# Patient Record
Sex: FEMALE | Race: WHITE | NOT HISPANIC OR LATINO | URBAN - METROPOLITAN AREA
[De-identification: names, ages, dates, MRNs, and addresses within clinical notes are randomized per-mention and may not be internally consistent; named-entity substitution may affect disease eponyms.]

---

## 2018-11-17 ENCOUNTER — INPATIENT HOSPITAL (OUTPATIENT)
Dept: URBAN - METROPOLITAN AREA HOSPITAL 107 | Facility: HOSPITAL | Age: 68
End: 2018-11-17
Payer: COMMERCIAL

## 2018-11-17 DIAGNOSIS — R18.8 OTHER ASCITES: ICD-10-CM

## 2018-11-17 DIAGNOSIS — K74.60 UNSPECIFIED CIRRHOSIS OF LIVER: ICD-10-CM

## 2018-11-17 DIAGNOSIS — R93.2 ABNORMAL FINDINGS ON DIAGNOSTIC IMAGING OF LIVER AND BILIARY: ICD-10-CM

## 2018-11-17 DIAGNOSIS — R07.9 CHEST PAIN, UNSPECIFIED: ICD-10-CM

## 2018-11-17 DIAGNOSIS — R60.1 GENERALIZED EDEMA: ICD-10-CM

## 2018-11-17 PROCEDURE — 99223 1ST HOSP IP/OBS HIGH 75: CPT

## 2018-11-19 ENCOUNTER — INPATIENT HOSPITAL (OUTPATIENT)
Dept: URBAN - METROPOLITAN AREA HOSPITAL 107 | Facility: HOSPITAL | Age: 68
End: 2018-11-19
Payer: COMMERCIAL

## 2018-11-19 DIAGNOSIS — R10.11 RIGHT UPPER QUADRANT PAIN: ICD-10-CM

## 2018-11-19 DIAGNOSIS — K74.69 OTHER CIRRHOSIS OF LIVER: ICD-10-CM

## 2018-11-19 DIAGNOSIS — R18.8 OTHER ASCITES: ICD-10-CM

## 2018-11-19 PROCEDURE — 99231 SBSQ HOSP IP/OBS SF/LOW 25: CPT | Performed by: PHYSICIAN ASSISTANT

## 2023-12-17 ENCOUNTER — APPOINTMENT (OUTPATIENT)
Dept: CT IMAGING | Facility: HOSPITAL | Age: 73
End: 2023-12-17
Payer: MEDICARE

## 2023-12-17 ENCOUNTER — HOSPITAL ENCOUNTER (EMERGENCY)
Facility: HOSPITAL | Age: 73
Discharge: HOME | End: 2023-12-17
Attending: EMERGENCY MEDICINE
Payer: MEDICARE

## 2023-12-17 VITALS
WEIGHT: 256 LBS | DIASTOLIC BLOOD PRESSURE: 74 MMHG | TEMPERATURE: 98.8 F | OXYGEN SATURATION: 97 % | SYSTOLIC BLOOD PRESSURE: 165 MMHG | RESPIRATION RATE: 18 BRPM | BODY MASS INDEX: 43.71 KG/M2 | HEART RATE: 75 BPM | HEIGHT: 64 IN

## 2023-12-17 DIAGNOSIS — R31.0 GROSS HEMATURIA: Primary | ICD-10-CM

## 2023-12-17 LAB
ABO GROUP BLD: NORMAL
ALBUMIN SERPL-MCNC: 3.2 G/DL (ref 3.5–5.2)
ALBUMIN/GLOB SERPL: 1.1 G/DL
ALP SERPL-CCNC: 119 U/L (ref 39–117)
ALT SERPL W P-5'-P-CCNC: 13 U/L (ref 1–33)
ANION GAP SERPL CALCULATED.3IONS-SCNC: 12 MMOL/L (ref 5–15)
AST SERPL-CCNC: 12 U/L (ref 1–32)
BASOPHILS # BLD AUTO: 0.09 10*3/MM3 (ref 0–0.2)
BASOPHILS NFR BLD AUTO: 0.7 % (ref 0–1.5)
BILIRUB SERPL-MCNC: 0.2 MG/DL (ref 0–1.2)
BLD GP AB SCN SERPL QL: NEGATIVE
BUN SERPL-MCNC: 13 MG/DL (ref 8–23)
BUN/CREAT SERPL: 11.2 (ref 7–25)
CALCIUM SPEC-SCNC: 8.8 MG/DL (ref 8.6–10.5)
CHLORIDE SERPL-SCNC: 106 MMOL/L (ref 98–107)
CO2 SERPL-SCNC: 20 MMOL/L (ref 22–29)
CREAT SERPL-MCNC: 1.16 MG/DL (ref 0.57–1)
DEPRECATED RDW RBC AUTO: 46.7 FL (ref 37–54)
EGFRCR SERPLBLD CKD-EPI 2021: 49.9 ML/MIN/1.73
EOSINOPHIL # BLD AUTO: 0.94 10*3/MM3 (ref 0–0.4)
EOSINOPHIL NFR BLD AUTO: 7.2 % (ref 0.3–6.2)
ERYTHROCYTE [DISTWIDTH] IN BLOOD BY AUTOMATED COUNT: 13.9 % (ref 12.3–15.4)
GLOBULIN UR ELPH-MCNC: 3 GM/DL
GLUCOSE SERPL-MCNC: 115 MG/DL (ref 65–99)
HCT VFR BLD AUTO: 29 % (ref 34–46.6)
HGB BLD-MCNC: 9.2 G/DL (ref 12–15.9)
INR PPP: 1.16 (ref 0.9–1.1)
LYMPHOCYTES # BLD AUTO: 1.54 10*3/MM3 (ref 0.7–3.1)
LYMPHOCYTES NFR BLD AUTO: 11.8 % (ref 19.6–45.3)
MCH RBC QN AUTO: 29 PG (ref 26.6–33)
MCHC RBC AUTO-ENTMCNC: 31.7 G/DL (ref 31.5–35.7)
MCV RBC AUTO: 91.5 FL (ref 79–97)
MONOCYTES # BLD AUTO: 0.98 10*3/MM3 (ref 0.1–0.9)
MONOCYTES NFR BLD AUTO: 7.5 % (ref 5–12)
NEUTROPHILS NFR BLD AUTO: 62.3 % (ref 42.7–76)
NEUTROPHILS NFR BLD AUTO: 8.1 10*3/MM3 (ref 1.7–7)
PLATELET # BLD AUTO: 558 10*3/MM3 (ref 140–450)
PMV BLD AUTO: 8.9 FL (ref 6–12)
POTASSIUM SERPL-SCNC: 3.7 MMOL/L (ref 3.5–5.2)
PROT SERPL-MCNC: 6.2 G/DL (ref 6–8.5)
PROTHROMBIN TIME: 15 SECONDS (ref 11.7–14.2)
RBC # BLD AUTO: 3.17 10*6/MM3 (ref 3.77–5.28)
RH BLD: NEGATIVE
SODIUM SERPL-SCNC: 138 MMOL/L (ref 136–145)
T&S EXPIRATION DATE: NORMAL
WBC NRBC COR # BLD AUTO: 13.01 10*3/MM3 (ref 3.4–10.8)

## 2023-12-17 PROCEDURE — 86900 BLOOD TYPING SEROLOGIC ABO: CPT | Performed by: PHYSICIAN ASSISTANT

## 2023-12-17 PROCEDURE — 25510000001 IOPAMIDOL 61 % SOLUTION: Performed by: PHYSICIAN ASSISTANT

## 2023-12-17 PROCEDURE — 96374 THER/PROPH/DIAG INJ IV PUSH: CPT

## 2023-12-17 PROCEDURE — 25010000002 MORPHINE PER 10 MG: Performed by: EMERGENCY MEDICINE

## 2023-12-17 PROCEDURE — 86850 RBC ANTIBODY SCREEN: CPT | Performed by: PHYSICIAN ASSISTANT

## 2023-12-17 PROCEDURE — 86901 BLOOD TYPING SEROLOGIC RH(D): CPT | Performed by: PHYSICIAN ASSISTANT

## 2023-12-17 PROCEDURE — 99285 EMERGENCY DEPT VISIT HI MDM: CPT

## 2023-12-17 PROCEDURE — 80053 COMPREHEN METABOLIC PANEL: CPT | Performed by: PHYSICIAN ASSISTANT

## 2023-12-17 PROCEDURE — 96375 TX/PRO/DX INJ NEW DRUG ADDON: CPT

## 2023-12-17 PROCEDURE — 85610 PROTHROMBIN TIME: CPT | Performed by: PHYSICIAN ASSISTANT

## 2023-12-17 PROCEDURE — 25010000002 ONDANSETRON PER 1 MG: Performed by: EMERGENCY MEDICINE

## 2023-12-17 PROCEDURE — 74177 CT ABD & PELVIS W/CONTRAST: CPT

## 2023-12-17 PROCEDURE — 85025 COMPLETE CBC W/AUTO DIFF WBC: CPT | Performed by: PHYSICIAN ASSISTANT

## 2023-12-17 RX ORDER — SACCHAROMYCES BOULARDII 250 MG
250 CAPSULE ORAL
COMMUNITY
Start: 2023-11-21

## 2023-12-17 RX ORDER — VANCOMYCIN HYDROCHLORIDE 125 MG/1
125 CAPSULE ORAL EVERY 6 HOURS
COMMUNITY
Start: 2023-12-15 | End: 2023-12-29

## 2023-12-17 RX ORDER — ONDANSETRON 2 MG/ML
4 INJECTION INTRAMUSCULAR; INTRAVENOUS ONCE
Status: COMPLETED | OUTPATIENT
Start: 2023-12-17 | End: 2023-12-17

## 2023-12-17 RX ORDER — MORPHINE SULFATE 2 MG/ML
4 INJECTION, SOLUTION INTRAMUSCULAR; INTRAVENOUS ONCE
Status: COMPLETED | OUTPATIENT
Start: 2023-12-17 | End: 2023-12-17

## 2023-12-17 RX ORDER — SODIUM CHLORIDE 0.9 % (FLUSH) 0.9 %
10 SYRINGE (ML) INJECTION AS NEEDED
Status: DISCONTINUED | OUTPATIENT
Start: 2023-12-17 | End: 2023-12-17 | Stop reason: HOSPADM

## 2023-12-17 RX ORDER — OXYCODONE HYDROCHLORIDE AND ACETAMINOPHEN 5; 325 MG/1; MG/1
1 TABLET ORAL ONCE
Status: COMPLETED | OUTPATIENT
Start: 2023-12-17 | End: 2023-12-17

## 2023-12-17 RX ORDER — SODIUM BICARBONATE 325 MG/1
325 TABLET ORAL
COMMUNITY
Start: 2023-12-08

## 2023-12-17 RX ORDER — OXYCODONE HYDROCHLORIDE AND ACETAMINOPHEN 5; 325 MG/1; MG/1
1 TABLET ORAL
COMMUNITY
Start: 2023-12-08 | End: 2024-01-07

## 2023-12-17 RX ORDER — CYCLOBENZAPRINE HCL 10 MG
5 TABLET ORAL
COMMUNITY
Start: 2023-12-08

## 2023-12-17 RX ADMIN — IOPAMIDOL 85 ML: 612 INJECTION, SOLUTION INTRAVENOUS at 08:05

## 2023-12-17 RX ADMIN — OXYCODONE AND ACETAMINOPHEN 1 TABLET: 5; 325 TABLET ORAL at 12:45

## 2023-12-17 RX ADMIN — ONDANSETRON 4 MG: 2 INJECTION INTRAMUSCULAR; INTRAVENOUS at 08:49

## 2023-12-17 RX ADMIN — MORPHINE SULFATE 4 MG: 2 INJECTION, SOLUTION INTRAMUSCULAR; INTRAVENOUS at 08:49

## 2023-12-17 NOTE — DISCHARGE INSTRUCTIONS
Please call urology on Monday to arrange removal of the left ureteral stent.  Continue the Macrobid you are prescribed.  Hold your baby aspirin.

## 2023-12-17 NOTE — ED PROVIDER NOTES
EMERGENCY DEPARTMENT ENCOUNTER      PCP: Provider, No Known  Patient Care Team:  Provider, No Known as PCP - General   Independent Historians: Patient    HPI:  Chief Complaint: Bleeding  A complete HPI/ROS/PMH/PSH/SH/FH are unobtainable due to: None    Chronic or social conditions impacting patient care (social determinants of health): None    Context: Kaylee Baker is a 73 y.o. female who presents to the ED c/o increased bleeding from her urethra.  Patient states she was recently discharged from [facility] after having undergone a procedure to get rid of clots in her bladder.  She states she had improved but then began passing additional clots while at her facility.  She does not take any blood thinners.  She states that she passes the clots that is very painful.  She does not believe she has had any blood in her stool.  No reported fevers.  She was discharged to a rehab facility from the hospital 2 days ago.    Review of prior external notes and/or external test results outside of this encounter: Reviewed discharge summary from 12/15/2023.  Patient was admitted on 12/1/2023.  She underwent cystourethroscopy, clot evacuation and fulguration with left stent change without complication on 12/5.  Seen by infectious disease and recommended to be on Macrobid for 10 days after discharge.  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  No acute surgical intervention but patient was placed in c-collar which she is to wear at all times.  Also treated for suspected C. difficile.  Discharged to rehab facility.      PAST MEDICAL HISTORY  Active Ambulatory Problems     Diagnosis Date Noted    No Active Ambulatory Problems     Resolved Ambulatory Problems     Diagnosis Date Noted    No Resolved Ambulatory Problems      Past Medical History:   Diagnosis Date    Cervical spine degeneration     CHF (congestive heart failure)     Cystitis     GERD (gastroesophageal reflux disease)     Hematuria     Hypertension     Leukocytosis     Rheumatoid arthritis        The patient has started, but not completed, their COVID-19 vaccination series.    PAST SURGICAL HISTORY  History reviewed. No pertinent surgical history.      FAMILY HISTORY  History reviewed. No pertinent family history.      SOCIAL HISTORY  Social History     Socioeconomic History    Marital status:          ALLERGIES  Patient has no known allergies.        REVIEW OF SYSTEMS  Review of Systems   Constitutional:  Negative for fever.   Cardiovascular:  Negative for chest pain.   Gastrointestinal:  Positive for diarrhea.   Genitourinary:  Positive for hematuria.        All systems reviewed and negative except for those discussed in HPI.       PHYSICAL EXAM    I have reviewed the triage vital signs and nursing notes.    ED Triage Vitals [12/17/23 0625]   Temp Heart Rate Resp BP SpO2   98.8 °F (37.1 °C) 101 20 125/96 96 %      Temp src Heart Rate Source Patient Position BP Location FiO2 (%)   Oral -- -- -- --       Physical Exam  GENERAL: alert, anxious  SKIN: Warm, dry  HENT: Normocephalic, atraumatic  EYES: no scleral icterus  CV: regular rhythm, regular rate  RESPIRATORY: normal effort, lungs clear  ABDOMEN: soft, mild suprapubic discomfort, nondistended  : several large clots noted in patient's depends  MUSCULOSKELETAL: no deformity  NEURO: alert, moves all extremities, follows commands          LAB RESULTS  Labs Reviewed   COMPREHENSIVE METABOLIC PANEL - Abnormal; Notable for the following components:       Result Value    Glucose 115 (*)     Creatinine 1.16 (*)     CO2 20.0 (*)     Albumin 3.2 (*)     Alkaline Phosphatase 119 (*)     eGFR 49.9 (*)     All other components within normal limits    Narrative:     GFR Normal >60  Chronic Kidney Disease <60  Kidney  Failure <15    The GFR formula is only valid for adults with stable renal function between ages 18 and 70.   PROTIME-INR - Abnormal; Notable for the following components:    Protime 15.0 (*)     INR 1.16 (*)     All other components within normal limits   CBC WITH AUTO DIFFERENTIAL - Abnormal; Notable for the following components:    WBC 13.01 (*)     RBC 3.17 (*)     Hemoglobin 9.2 (*)     Hematocrit 29.0 (*)     Platelets 558 (*)     Lymphocyte % 11.8 (*)     Eosinophil % 7.2 (*)     Neutrophils, Absolute 8.10 (*)     Monocytes, Absolute 0.98 (*)     Eosinophils, Absolute 0.94 (*)     All other components within normal limits   TYPE AND SCREEN   CBC AND DIFFERENTIAL    Narrative:     The following orders were created for panel order CBC & Differential.  Procedure                               Abnormality         Status                     ---------                               -----------         ------                     CBC Auto Differential[[id number]]        Abnormal            Final result                 Please view results for these tests on the individual orders.       Ordered the above labs and independently reviewed and interpreted the results.        RADIOLOGY  CT Abdomen Pelvis With Contrast   Final Result           1. Left ureteral stent is low in position, extending into the urethra   (incompletely included), with moderate left hydronephrosis that may   reflect stent dysfunction. Stones in the proximal ureter (above the   level of the stent). Small urothelial enhancement at the proximal left   ureter could be evidence of inflammation/infection.       2. Proctitis, with wall thickening, follow-up recommended.       3. Nonspecific retroperitoneal adenopathy.       This report was finalized on 12/17/2023 8:34 AM by Dr. Marcio Paulino M.D on Workstation: [id number]              I ordered the above noted radiological studies. Independently reviewed and interpreted by me.  See dictation for official  radiology interpretation.      PROCEDURES    Procedures      MEDICATIONS GIVEN IN ER    Medications   iopamidol (ISOVUE-300) 61 % injection 100 mL (85 mL Intravenous Given by Other 12/17/23 0805)   morphine injection 4 mg (4 mg Intravenous Given 12/17/23 0849)   ondansetron (ZOFRAN) injection 4 mg (4 mg Intravenous Given 12/17/23 0849)   oxyCODONE-acetaminophen (PERCOCET) 5-325 MG per tablet 1 tablet (1 tablet Oral Given 12/17/23 1245)         PROGRESS, DATA ANALYSIS, CONSULTS, AND MEDICAL DECISION MAKING    All labs have been independently reviewed and interpreted by me.  All radiology studies have been independently reviewed and interpreted by me and discussed with radiologist dictating the report.   EKG's independently reviewed and interpreted by me.  Discussion below represents my analysis of pertinent findings related to patient's condition, differential diagnosis, treatment plan and final disposition.    Differential diagnosis: hemorrhagic cystitis, urethral injury, ureterolithiasis, ulcer, neoplasm, laceration, GI bleed    ED Course as of 12/21/23 1707   Sun Dec 17, 2023   0747 WBC(!): 13.01  14.9 three days ago [DC]   0747 Hemoglobin(!): 9.2  Same three days ago [DC]   0921 Discussed with Dr. Escalona, urology, who felt the patient could follow-up as outpatient for left ureteral stent removal.  He recommended continuing the Macrobid she had been prescribed, hold baby aspirin.  He said that some hematuria as expected with bilateral ureteral stents in place. [JR]   0930 WBC(!): 13.01  Improved from prior [JR]   0937 Patient updated on plan for discharge back to rehab facility and follow-up as outpatient with urology for removal of the left ureteral stent.  Her hemoglobin is improved from recent today and she is hemodynamically stable, afebrile.  She is still taking Macrobid from recent hospitalization. [JR]      ED Course User Index  [DC] Dulce Noble PA  [JR] Dejuan Lara MD             AS OF 17:07  EST VITALS:    BP - 165/74  HR - 75  TEMP - 98.8 °F (37.1 °C) (Oral)  O2 SATS - 97%        DIAGNOSIS  Final diagnoses:   Gross hematuria         DISPOSITION  ED Disposition       ED Disposition   Discharge    Condition   Stable    Comment   --                  Note Disclaimer: At [facility], we believe that sharing information builds trust and better relationships. You are receiving this note because you recently visited [facility]. It is possible you will see health information before a provider has talked with you about it. This kind of information can be easy to misunderstand. To help you fully understand what it means for your health, we urge you to discuss this note with your provider.         Dulce Noble PA  12/21/23 3817